# Patient Record
Sex: FEMALE | NOT HISPANIC OR LATINO | Employment: UNEMPLOYED | URBAN - METROPOLITAN AREA
[De-identification: names, ages, dates, MRNs, and addresses within clinical notes are randomized per-mention and may not be internally consistent; named-entity substitution may affect disease eponyms.]

---

## 2021-11-06 ENCOUNTER — HOSPITAL ENCOUNTER (EMERGENCY)
Facility: HOSPITAL | Age: 21
Discharge: HOME | End: 2021-11-06
Attending: EMERGENCY MEDICINE | Admitting: EMERGENCY MEDICINE

## 2021-11-06 VITALS
RESPIRATION RATE: 18 BRPM | OXYGEN SATURATION: 99 % | HEART RATE: 84 BPM | TEMPERATURE: 98.3 F | SYSTOLIC BLOOD PRESSURE: 104 MMHG | DIASTOLIC BLOOD PRESSURE: 80 MMHG

## 2021-11-06 DIAGNOSIS — R21 SKIN RASH: Primary | ICD-10-CM

## 2021-11-06 PROCEDURE — 99283 EMERGENCY DEPT VISIT LOW MDM: CPT

## 2021-11-06 NOTE — DISCHARGE INSTRUCTIONS
All the medicine you need for this rash is found over-the-counter.  You can use hydrocortisone cream or ointment to apply directly to the rash and you can to use Benadryl over-the-counter as instructed on the bottle

## 2021-11-06 NOTE — ED TRIAGE NOTES
Pt has small red rash on bilateral forearm that are itching started yesterday.     Pt arrives in triage with mask on. Triage staff wearing N95 masks and goggles.

## 2021-11-06 NOTE — ED PROVIDER NOTES
EMERGENCY DEPARTMENT ENCOUNTER    Room Number:  19/19  Date of encounter:  11/6/2021  PCP: Provider, No Known  Historian: Patient      HPI:  Chief Complaint: Rash  A complete HPI/ROS/PMH/PSH/SH/FH are unobtainable due to: Nothing    Context: Ariela Degroot is a 20 y.o. female who presents to the ED c/o itchy rash on both arms that started last night.  Patient said there is some bumps, it is a little red, is itchy, and it kept her up all night.  She is not aware of any allergen she was exposed to, and she has no other signs and symptoms of a systemic allergic reaction.  Currently she describes it as moderate and she has not taken anything for      PAST MEDICAL HISTORY  Active Ambulatory Problems     Diagnosis Date Noted   • No Active Ambulatory Problems     Resolved Ambulatory Problems     Diagnosis Date Noted   • No Resolved Ambulatory Problems     No Additional Past Medical History         PAST SURGICAL HISTORY  No past surgical history on file.      FAMILY HISTORY  No family history on file.      SOCIAL HISTORY  Social History     Socioeconomic History   • Marital status: Single         ALLERGIES  Patient has no known allergies.        REVIEW OF SYSTEMS  Review of Systems     All systems reviewed and negative except for those discussed in HPI.       PHYSICAL EXAM    I have reviewed the triage vital signs and nursing notes.    ED Triage Vitals [11/06/21 1504]   Temp Heart Rate Resp BP SpO2   98.3 °F (36.8 °C) 84 18 104/80 99 %      Temp src Heart Rate Source Patient Position BP Location FiO2 (%)   Oral -- -- -- --       Physical Exam  GENERAL: *not distressed  HENT: nares patent  EYES: no scleral icterus  CV: regular rhythm, regular rate  RESPIRATORY: normal effort  ABDOMEN: soft  MUSCULOSKELETAL: no deformity  NEURO: alert, moves all extremities, follows commands  SKIN: warm, dry.  There are few small patches of reddish bumps on both forearms.  It is apparent that she has been scratching at them, but they  do not look infected or inflamed.  These are more maculopapular than urticarial and they appear to be in some patches.  She does not think she got into any poison ivy or similar.        LAB RESULTS  No results found for this or any previous visit (from the past 24 hour(s)).    Ordered the above labs and independently reviewed the results.        RADIOLOGY  No Radiology Exams Resulted Within Past 24 Hours    I ordered the above noted radiological studies. Reviewed by me and discussed with radiologist.  See dictation for official radiology interpretation.      PROCEDURES    Procedures      MEDICATIONS GIVEN IN ER    Medications - No data to display      PROGRESS, DATA ANALYSIS, CONSULTS, AND MEDICAL DECISION MAKING    All labs have been independently reviewed by me.  All radiology studies have been reviewed by me and discussed with radiologist dictating the report.   EKG's independently viewed and interpreted by me.  Discussion below represents my analysis of pertinent findings related to patient's condition, differential diagnosis, treatment plan and final disposition.        ED Course as of 11/06/21 1923   Sat Nov 06, 2021 1923 This is a very benign looking rash and I think she can treat it topically at home [DP]      ED Course User Index  [DP] Britton Dias MD           PPE: The patient wore a surgical mask throughout the entire patient encounter. I wore an N95.    AS OF 19:23 EDT VITALS:    BP - 104/80  HR - 84  TEMP - 98.3 °F (36.8 °C) (Oral)  O2 SATS - 99%        DIAGNOSIS  Final diagnoses:   Skin rash         DISPOSITION  Discharge           Britton Dias MD  11/06/21 1923

## 2022-01-24 ENCOUNTER — HOSPITAL ENCOUNTER (EMERGENCY)
Facility: HOSPITAL | Age: 22
Discharge: HOME | End: 2022-01-24
Attending: EMERGENCY MEDICINE | Admitting: EMERGENCY MEDICINE

## 2022-01-24 ENCOUNTER — APPOINTMENT (OUTPATIENT)
Dept: CT IMAGING | Facility: HOSPITAL | Age: 22
End: 2022-01-24

## 2022-01-24 VITALS
WEIGHT: 105.82 LBS | BODY MASS INDEX: 19.47 KG/M2 | RESPIRATION RATE: 18 BRPM | TEMPERATURE: 98.4 F | HEART RATE: 95 BPM | DIASTOLIC BLOOD PRESSURE: 61 MMHG | OXYGEN SATURATION: 100 % | SYSTOLIC BLOOD PRESSURE: 99 MMHG | HEIGHT: 62 IN

## 2022-01-24 DIAGNOSIS — R53.1 GENERALIZED WEAKNESS: Primary | ICD-10-CM

## 2022-01-24 DIAGNOSIS — R10.30 LOWER ABDOMINAL PAIN: ICD-10-CM

## 2022-01-24 LAB
ALBUMIN SERPL-MCNC: 4 G/DL (ref 3.5–5.2)
ALBUMIN/GLOB SERPL: 1.5 G/DL
ALP SERPL-CCNC: 70 U/L (ref 39–117)
ALT SERPL W P-5'-P-CCNC: 8 U/L (ref 1–33)
ANION GAP SERPL CALCULATED.3IONS-SCNC: 10.7 MMOL/L (ref 5–15)
AST SERPL-CCNC: 10 U/L (ref 1–32)
BASOPHILS # BLD AUTO: 0.08 10*3/MM3 (ref 0–0.2)
BASOPHILS NFR BLD AUTO: 0.9 % (ref 0–1.5)
BILIRUB SERPL-MCNC: 0.8 MG/DL (ref 0–1.2)
BILIRUB UR QL STRIP: NEGATIVE
BUN SERPL-MCNC: 15 MG/DL (ref 6–20)
BUN/CREAT SERPL: 50 (ref 7–25)
CALCIUM SPEC-SCNC: 8.8 MG/DL (ref 8.6–10.5)
CHLORIDE SERPL-SCNC: 100 MMOL/L (ref 98–107)
CLARITY UR: CLEAR
CO2 SERPL-SCNC: 22.3 MMOL/L (ref 22–29)
COLOR UR: YELLOW
CREAT SERPL-MCNC: 0.3 MG/DL (ref 0.57–1)
DEPRECATED RDW RBC AUTO: 40.6 FL (ref 37–54)
EOSINOPHIL # BLD AUTO: 0.1 10*3/MM3 (ref 0–0.4)
EOSINOPHIL NFR BLD AUTO: 1.1 % (ref 0.3–6.2)
ERYTHROCYTE [DISTWIDTH] IN BLOOD BY AUTOMATED COUNT: 13 % (ref 12.3–15.4)
GFR SERPL CREATININE-BSD FRML MDRD: >150 ML/MIN/1.73
GFR SERPL CREATININE-BSD FRML MDRD: >150 ML/MIN/1.73
GLOBULIN UR ELPH-MCNC: 2.6 GM/DL
GLUCOSE SERPL-MCNC: 136 MG/DL (ref 65–99)
GLUCOSE UR STRIP-MCNC: ABNORMAL MG/DL
HCG SERPL QL: NEGATIVE
HCT VFR BLD AUTO: 38.5 % (ref 34–46.6)
HGB BLD-MCNC: 12.3 G/DL (ref 12–15.9)
HGB UR QL STRIP.AUTO: NEGATIVE
HOLD SPECIMEN: NORMAL
IMM GRANULOCYTES # BLD AUTO: 0.02 10*3/MM3 (ref 0–0.05)
IMM GRANULOCYTES NFR BLD AUTO: 0.2 % (ref 0–0.5)
KETONES UR QL STRIP: ABNORMAL
LEUKOCYTE ESTERASE UR QL STRIP.AUTO: NEGATIVE
LIPASE SERPL-CCNC: 8 U/L (ref 13–60)
LYMPHOCYTES # BLD AUTO: 2.53 10*3/MM3 (ref 0.7–3.1)
LYMPHOCYTES NFR BLD AUTO: 28.1 % (ref 19.6–45.3)
MCH RBC QN AUTO: 27.5 PG (ref 26.6–33)
MCHC RBC AUTO-ENTMCNC: 31.9 G/DL (ref 31.5–35.7)
MCV RBC AUTO: 85.9 FL (ref 79–97)
MONOCYTES # BLD AUTO: 0.67 10*3/MM3 (ref 0.1–0.9)
MONOCYTES NFR BLD AUTO: 7.4 % (ref 5–12)
NEUTROPHILS NFR BLD AUTO: 5.6 10*3/MM3 (ref 1.7–7)
NEUTROPHILS NFR BLD AUTO: 62.3 % (ref 42.7–76)
NITRITE UR QL STRIP: NEGATIVE
NRBC BLD AUTO-RTO: 0 /100 WBC (ref 0–0.2)
PH UR STRIP.AUTO: <=5 [PH] (ref 5–8)
PLATELET # BLD AUTO: 281 10*3/MM3 (ref 140–450)
PMV BLD AUTO: 11.2 FL (ref 6–12)
POTASSIUM SERPL-SCNC: 3.8 MMOL/L (ref 3.5–5.2)
PROT SERPL-MCNC: 6.6 G/DL (ref 6–8.5)
PROT UR QL STRIP: NEGATIVE
RBC # BLD AUTO: 4.48 10*6/MM3 (ref 3.77–5.28)
SODIUM SERPL-SCNC: 133 MMOL/L (ref 136–145)
SP GR UR STRIP: >=1.03 (ref 1–1.03)
UROBILINOGEN UR QL STRIP: ABNORMAL
WBC NRBC COR # BLD: 9 10*3/MM3 (ref 3.4–10.8)
WHOLE BLOOD HOLD SPECIMEN: NORMAL
WHOLE BLOOD HOLD SPECIMEN: NORMAL

## 2022-01-24 PROCEDURE — 84703 CHORIONIC GONADOTROPIN ASSAY: CPT | Performed by: EMERGENCY MEDICINE

## 2022-01-24 PROCEDURE — 85025 COMPLETE CBC W/AUTO DIFF WBC: CPT | Performed by: EMERGENCY MEDICINE

## 2022-01-24 PROCEDURE — 74177 CT ABD & PELVIS W/CONTRAST: CPT

## 2022-01-24 PROCEDURE — 99284 EMERGENCY DEPT VISIT MOD MDM: CPT

## 2022-01-24 PROCEDURE — 83690 ASSAY OF LIPASE: CPT | Performed by: EMERGENCY MEDICINE

## 2022-01-24 PROCEDURE — 25010000002 IOPAMIDOL 61 % SOLUTION: Performed by: EMERGENCY MEDICINE

## 2022-01-24 PROCEDURE — 36415 COLL VENOUS BLD VENIPUNCTURE: CPT

## 2022-01-24 PROCEDURE — 81003 URINALYSIS AUTO W/O SCOPE: CPT | Performed by: EMERGENCY MEDICINE

## 2022-01-24 PROCEDURE — 80053 COMPREHEN METABOLIC PANEL: CPT | Performed by: EMERGENCY MEDICINE

## 2022-01-24 RX ORDER — CEPHALEXIN 500 MG/1
500 CAPSULE ORAL 3 TIMES DAILY
Qty: 9 CAPSULE | Refills: 0 | Status: SHIPPED | OUTPATIENT
Start: 2022-01-24

## 2022-01-24 RX ORDER — SODIUM CHLORIDE 0.9 % (FLUSH) 0.9 %
10 SYRINGE (ML) INJECTION AS NEEDED
Status: DISCONTINUED | OUTPATIENT
Start: 2022-01-24 | End: 2022-01-24 | Stop reason: HOSPADM

## 2022-01-24 RX ADMIN — IOPAMIDOL 100 ML: 612 INJECTION, SOLUTION INTRAVENOUS at 07:53

## 2022-01-24 RX ADMIN — SODIUM CHLORIDE 1000 ML: 9 INJECTION, SOLUTION INTRAVENOUS at 07:25

## 2022-01-24 NOTE — ED NOTES
Pt c/o of lower abd pain that started around 0100. Pt states that she is feeling very weak and fatigued. Pt states that she is so weak she cannot walk. Pt was hypotensive for EMS and has had 300mL bolus brining her to 106/61. Pt states that her last period was 2 weeks ago. Pt states that her pain is 7/10 in lower abd.      Manuel Rome RN  01/24/22 0618

## 2022-01-24 NOTE — ED TRIAGE NOTES
Pt arrives to ED via EMS with c/o of abdominal pain and hypotension.  Unable to eat/drink today.  Pt is diabetic ().  States she feels drowsy.    Patient was placed in face mask during first look triage.  Patient was wearing a face mask throughout encounter.  I wore personal protective equipment throughout the encounter.  Hand hygiene was performed before and after patient encounter.

## 2022-01-24 NOTE — ED PROVIDER NOTES
EMERGENCY DEPARTMENT ENCOUNTER    Room Number:  11/11  PCP: Provider, No Known  Historian: Patient  History Limited By: Nothing      HPI  Chief Complaint: Abdominal pain weak and fatigued  Context: Zane Titus is a 21 y.o. female who presents to the ED c/o weakness fatigue abdominal pain.  Patient states she is diabetic.  States she has felt fatigue and weakness for the last 3 days.  Patient has had lower abdominal pain.  Has had no vomiting or diarrhea.  Patient states she has had some mild nausea.  Has had no vaginal bleeding or vaginal discharge.  No prior abdominal surgeries.  Patient is had no cough congestion or fevers.  Patient states she has had some weight loss as well.  States she has no appetite.        Location: Lower abdomen  Radiation: None  Character: Cramping  Duration: 3 days  Severity: Moderate  Progression: Not improving  Aggravating Factors: Nothing  Alleviating Factors: Nothing        MEDICAL RECORD REVIEW    Patient was here November 2021 with skin rash          PAST MEDICAL HISTORY  Active Ambulatory Problems     Diagnosis Date Noted   • No Active Ambulatory Problems     Resolved Ambulatory Problems     Diagnosis Date Noted   • No Resolved Ambulatory Problems     No Additional Past Medical History         PAST SURGICAL HISTORY  No past surgical history on file.      FAMILY HISTORY  No family history on file.      SOCIAL HISTORY  Social History     Socioeconomic History   • Marital status: Single         ALLERGIES  Patient has no known allergies.        REVIEW OF SYSTEMS  Review of Systems   Constitutional: Positive for fatigue and unexpected weight change. Negative for fever.   HENT: Negative for sore throat.    Eyes: Negative.    Respiratory: Negative for cough and shortness of breath.    Cardiovascular: Negative for chest pain.   Gastrointestinal: Positive for abdominal pain. Negative for diarrhea and vomiting.   Genitourinary: Negative for dysuria.   Musculoskeletal: Negative for  neck pain.   Skin: Negative for rash.   Allergic/Immunologic: Negative.    Neurological: Negative for weakness, numbness and headaches.   Hematological: Negative.    Psychiatric/Behavioral: Negative.    All other systems reviewed and are negative.           PHYSICAL EXAM  ED Triage Vitals [01/24/22 0608]   Temp Heart Rate Resp BP SpO2   98.4 °F (36.9 °C) 90 18 106/61 100 %      Temp src Heart Rate Source Patient Position BP Location FiO2 (%)   Oral -- -- -- --       Physical Exam  Vitals and nursing note reviewed.   Constitutional:       General: She is not in acute distress.  HENT:      Head: Normocephalic and atraumatic.   Eyes:      Pupils: Pupils are equal, round, and reactive to light.   Cardiovascular:      Rate and Rhythm: Normal rate and regular rhythm.      Heart sounds: Normal heart sounds.   Pulmonary:      Effort: Pulmonary effort is normal. No respiratory distress.      Breath sounds: Normal breath sounds.   Abdominal:      Palpations: Abdomen is soft.      Tenderness: There is abdominal tenderness in the suprapubic area. There is no guarding or rebound.   Musculoskeletal:         General: Normal range of motion.      Cervical back: Normal range of motion and neck supple.   Skin:     General: Skin is warm and dry.      Findings: No rash.   Neurological:      Mental Status: She is alert and oriented to person, place, and time.      Sensory: Sensation is intact.   Psychiatric:         Mood and Affect: Mood and affect normal.       Patient was wearing a face mask when I entered the room and they continued to wear a mask throughout their stay in the ED.  I wore PPE, including  gloves, face mask with shield or face mask with goggles whenever I was in the room with patient.       LAB RESULTS  Recent Results (from the past 24 hour(s))   Comprehensive Metabolic Panel    Collection Time: 01/24/22  6:27 AM    Specimen: Blood   Result Value Ref Range    Glucose 136 (H) 65 - 99 mg/dL    BUN 15 6 - 20 mg/dL     Creatinine 0.30 (L) 0.57 - 1.00 mg/dL    Sodium 133 (L) 136 - 145 mmol/L    Potassium 3.8 3.5 - 5.2 mmol/L    Chloride 100 98 - 107 mmol/L    CO2 22.3 22.0 - 29.0 mmol/L    Calcium 8.8 8.6 - 10.5 mg/dL    Total Protein 6.6 6.0 - 8.5 g/dL    Albumin 4.00 3.50 - 5.20 g/dL    ALT (SGPT) 8 1 - 33 U/L    AST (SGOT) 10 1 - 32 U/L    Alkaline Phosphatase 70 39 - 117 U/L    Total Bilirubin 0.8 0.0 - 1.2 mg/dL    eGFR Non African Amer >150 >60 mL/min/1.73    eGFR  African Amer >150 >60 mL/min/1.73    Globulin 2.6 gm/dL    A/G Ratio 1.5 g/dL    BUN/Creatinine Ratio 50.0 (H) 7.0 - 25.0    Anion Gap 10.7 5.0 - 15.0 mmol/L   Lipase    Collection Time: 01/24/22  6:27 AM    Specimen: Blood   Result Value Ref Range    Lipase 8 (L) 13 - 60 U/L   hCG, Serum, Qualitative    Collection Time: 01/24/22  6:27 AM    Specimen: Blood   Result Value Ref Range    HCG Qualitative Negative Negative   Green Top (Gel)    Collection Time: 01/24/22  6:27 AM   Result Value Ref Range    Extra Tube Hold for add-ons.    Lavender Top    Collection Time: 01/24/22  6:27 AM   Result Value Ref Range    Extra Tube hold for add-on    Light Blue Top    Collection Time: 01/24/22  6:27 AM   Result Value Ref Range    Extra Tube hold for add-on    CBC Auto Differential    Collection Time: 01/24/22  6:27 AM    Specimen: Blood   Result Value Ref Range    WBC 9.00 3.40 - 10.80 10*3/mm3    RBC 4.48 3.77 - 5.28 10*6/mm3    Hemoglobin 12.3 12.0 - 15.9 g/dL    Hematocrit 38.5 34.0 - 46.6 %    MCV 85.9 79.0 - 97.0 fL    MCH 27.5 26.6 - 33.0 pg    MCHC 31.9 31.5 - 35.7 g/dL    RDW 13.0 12.3 - 15.4 %    RDW-SD 40.6 37.0 - 54.0 fl    MPV 11.2 6.0 - 12.0 fL    Platelets 281 140 - 450 10*3/mm3    Neutrophil % 62.3 42.7 - 76.0 %    Lymphocyte % 28.1 19.6 - 45.3 %    Monocyte % 7.4 5.0 - 12.0 %    Eosinophil % 1.1 0.3 - 6.2 %    Basophil % 0.9 0.0 - 1.5 %    Immature Grans % 0.2 0.0 - 0.5 %    Neutrophils, Absolute 5.60 1.70 - 7.00 10*3/mm3    Lymphocytes, Absolute 2.53 0.70 -  3.10 10*3/mm3    Monocytes, Absolute 0.67 0.10 - 0.90 10*3/mm3    Eosinophils, Absolute 0.10 0.00 - 0.40 10*3/mm3    Basophils, Absolute 0.08 0.00 - 0.20 10*3/mm3    Immature Grans, Absolute 0.02 0.00 - 0.05 10*3/mm3    nRBC 0.0 0.0 - 0.2 /100 WBC   Urinalysis With Microscopic If Indicated (No Culture) - Urine, Clean Catch    Collection Time: 01/24/22  8:17 AM    Specimen: Urine, Clean Catch   Result Value Ref Range    Color, UA Yellow Yellow, Straw    Appearance, UA Clear Clear    pH, UA <=5.0 5.0 - 8.0    Specific Gravity, UA >=1.030 1.005 - 1.030    Glucose,  mg/dL (1+) (A) Negative    Ketones, UA 80 mg/dL (3+) (A) Negative    Bilirubin, UA Negative Negative    Blood, UA Negative Negative    Protein, UA Negative Negative    Leuk Esterase, UA Negative Negative    Nitrite, UA Negative Negative    Urobilinogen, UA 0.2 E.U./dL 0.2 - 1.0 E.U./dL       Ordered the above labs and reviewed the results.        RADIOLOGY  CT Abdomen Pelvis With Contrast   Preliminary Result   1. Significantly thickened urinary bladder wall. Please correlate   clinically for acute cystitis/UTI. There is no evidence for   pyelonephritis.   2. Small amount of free fluid at the cul-de-sac is likely secondary to   rupture of an ovarian cyst or follicle. The central low attenuation   within the uterus is not typical in appearance for endometrial   thickening. Gynecology follow-up is recommended.       Discussed with Dr. Resendiz.               Ordered the above noted radiological studies. Reviewed by me in PACS.  Discussed with Dr. Munoz (radiologist) regarding CT/MRI scan results.          PROCEDURES  Procedures            MEDICATIONS GIVEN IN ER  Medications   sodium chloride 0.9 % flush 10 mL (has no administration in time range)   sodium chloride 0.9 % bolus 1,000 mL (0 mL Intravenous Stopped 1/24/22 7044)   iopamidol (ISOVUE-300) 61 % injection 100 mL (100 mL Intravenous Given 1/24/22 8855)             PROGRESS AND CONSULTS  ED  Course as of 01/24/22 1117   Mon Jan 24, 2022   0905 09:05 EST  Patient presents with multiple complaints.  Patient does appear dehydrated.  Patient is diabetic but CO2 is 22 and anion gap is 10 so doubt ketoacidosis.  Has been given fluids here.  Patient CT scan shows a thick-walled bladder however her urine does not appear infected.  We will give her 3 days of antibiotics for this.  Patient also has abnormal appearing uterus and will be referred to OB/GYN.  Instructed to return here for worsening symptoms.  Patient refused Covid swab so this is still a possibility.  No evidence of appendicitis ectopic pregnancy. [SL]      ED Course User Index  [SL] Brando Resendiz MD           MEDICAL DECISION MAKING      MDM  Number of Diagnoses or Management Options     Amount and/or Complexity of Data Reviewed  Clinical lab tests: reviewed and ordered (White blood cell count negative)  Tests in the radiology section of CPT®: reviewed and ordered (CT scan with thick-walled bladder)               DIAGNOSIS  Final diagnoses:   Generalized weakness   Lower abdominal pain           DISPOSITION  DISCHARGE    Patient discharged in stable condition.    Reviewed implications of results, diagnosis, meds, responsibility to follow up, warning signs and symptoms of possible worsening, potential complications and reasons to return to ER, including worsening symptoms.    Patient/Family voiced understanding of above instructions.    Discussed plan for discharge, as there is no emergent indication for admission. Patient referred to primary care provider for BP management due to today's BP. Pt/family is agreeable and understands need for follow up and repeat testing.  Pt is aware that discharge does not mean that nothing is wrong but it indicates no emergency is present that requires admission and they must continue care with follow-up as given below or physician of their choice.     FOLLOW-UP  PATIENT CONNECTION - [facility]  [place] [address]  [phone]  Schedule an appointment as soon as possible for a visit       Adama Blake MD  [address] [facility] [address] [phone]    Schedule an appointment as soon as possible for a visit            Medication List      New Prescriptions    cephalexin 500 MG capsule  Commonly known as: KEFLEX  Take 1 capsule by mouth 3 (Three) Times a Day.           Where to Get Your Medications      These medications were sent to [id number] - [place], [place] - [address] [address] AT [facility] OF [place] [address] - [phone]  - [phone] FX  [address], [place] [address]    Phone: [phone]   · cephalexin 500 MG capsule             Latest Documented Vital Signs:  As of 11:17 EST  BP- 99/61 HR- 95 Temp- 98.4 °F (36.9 °C) (Oral) O2 sat- 100%                         Brando Resendiz MD  01/24/22 0251

## 2022-01-27 NOTE — PROGRESS NOTES
"Chief Complaint  Establish Care and Weight Loss (20-25 lbs)    Subjective          Ariela Germain presents to [facility]  History of Present Illness     Establish care, new to me and [facility] primary care    Patient today has multiple complaints including hair loss, unintentional weight loss of 20 to 25 pounds, bleeding gums, yellowish appearance.    Sees endocrine doctor at [place] per patient.  Takes Tresiba 25 units in the morning, humalag three times daily on sliding scale.  She did some blood work with that doctor about 4 months.  She will see them again in April. Usually around 148 but can fluctuate.  She does not eat much and very tired.  Unknownif thyroid has been an issue  Sees Gynecology for painful periods and prescribed Aurovela    Trouble swallowing ging on for about 4 months.  Occasionally food is coming back up.  Never evaluated.  She thinks that is the cause of abnormal weight loss.    Objective   Vital Signs:   /60 (BP Location: Right arm, Patient Position: Sitting, Cuff Size: Adult)   Pulse 104   Temp 98.2 °F (36.8 °C) (Temporal)   Resp 16   Ht 157.5 cm (62\")   Wt 54.3 kg (119 lb 9.6 oz)   SpO2 98%   BMI 21.88 kg/m²     Physical Exam  Vitals and nursing note reviewed.   Constitutional:       General: She is not in acute distress.     Appearance: Normal appearance.   Neck:      Thyroid: No thyromegaly or thyroid tenderness.      Trachea: Trachea normal.   Cardiovascular:      Rate and Rhythm: Normal rate and regular rhythm.      Heart sounds: Normal heart sounds. No murmur heard.      Pulmonary:      Effort: Pulmonary effort is normal.      Breath sounds: Normal breath sounds.   Abdominal:      General: Bowel sounds are normal.      Palpations: Abdomen is soft.      Tenderness: There is no abdominal tenderness.   Musculoskeletal:      Cervical back: Normal range of motion.   Lymphadenopathy:      Cervical: No cervical adenopathy.   Neurological:      Mental " Status: She is alert.        Result Review :   The following data was reviewed by: Kenneth Villa MD on 01/28/2022:                Assessment and Plan    Diagnoses and all orders for this visit:    1. Establishing care with new doctor, encounter for (Primary)  -     CBC w AUTO Differential; Future  -     Comprehensive metabolic panel; Future  -     Hemoglobin A1c; Future  -     Lipid Panel With LDL / HDL Ratio; Future  -     Microalbumin / Creatinine Urine Ratio - Urine, Clean Catch; Future  -     Urinalysis With Microscopic - Urine, Clean Catch; Future  -     TSH Rfx On Abnormal To Free T4; Future  -     Ambulatory Referral to Speech Therapy    2. Type 1 diabetes mellitus without complication (HCC)  -     CBC w AUTO Differential; Future  -     Comprehensive metabolic panel; Future  -     Hemoglobin A1c; Future  -     Microalbumin / Creatinine Urine Ratio - Urine, Clean Catch; Future    3. Abnormal weight loss  -     CBC w AUTO Differential; Future  -     Comprehensive metabolic panel; Future  -     Lipid Panel With LDL / HDL Ratio; Future  -     TSH Rfx On Abnormal To Free T4; Future    4. Fatigue, unspecified type  -     CBC w AUTO Differential; Future  -     Comprehensive metabolic panel; Future  -     Hemoglobin A1c; Future  -     TSH Rfx On Abnormal To Free T4; Future    5. Screening for blood or protein in urine  -     Urinalysis With Microscopic - Urine, Clean Catch; Future    6. Screening for lipid disorders  -     Lipid Panel With LDL / HDL Ratio; Future    7. Painful menstrual periods    8. Oropharyngeal dysphagia  -     Ambulatory Referral to Speech Therapy    9. Hair loss  -     Ambulatory Referral to Dermatology      I will get some labs today to check stability of the type 1 diabetes.  Also get some labs to check on the abnormal weight loss, fatigue, hair loss.  Referral to speech to look into the dysphagia which the patient believes the abnormal weight loss is secondary to the dysphagia.  Pain from  menstrual periods are being followed by gynecology currently and no excessive bleeding currently.  I will have the patient follow back up with me in 1 month.  Continue medication for diabetes as above.        Follow Up   Return in about 4 weeks (around 2/25/2022) for Recheck - DM, Fatigue, weight loss.  Patient was given instructions and counseling regarding her condition or for health maintenance advice. Please see specific information pulled into the AVS if appropriate.

## 2022-01-28 ENCOUNTER — OFFICE VISIT (OUTPATIENT)
Dept: INTERNAL MEDICINE | Facility: CLINIC | Age: 22
End: 2022-01-28

## 2022-01-28 VITALS
HEART RATE: 104 BPM | DIASTOLIC BLOOD PRESSURE: 60 MMHG | WEIGHT: 119.6 LBS | SYSTOLIC BLOOD PRESSURE: 104 MMHG | BODY MASS INDEX: 22.01 KG/M2 | TEMPERATURE: 98.2 F | OXYGEN SATURATION: 98 % | RESPIRATION RATE: 16 BRPM | HEIGHT: 62 IN

## 2022-01-28 DIAGNOSIS — E10.9 TYPE 1 DIABETES MELLITUS WITHOUT COMPLICATION: ICD-10-CM

## 2022-01-28 DIAGNOSIS — Z76.89 ESTABLISHING CARE WITH NEW DOCTOR, ENCOUNTER FOR: Primary | ICD-10-CM

## 2022-01-28 DIAGNOSIS — Z13.220 SCREENING FOR LIPID DISORDERS: ICD-10-CM

## 2022-01-28 DIAGNOSIS — L65.9 HAIR LOSS: ICD-10-CM

## 2022-01-28 DIAGNOSIS — R63.4 ABNORMAL WEIGHT LOSS: ICD-10-CM

## 2022-01-28 DIAGNOSIS — R13.12 OROPHARYNGEAL DYSPHAGIA: ICD-10-CM

## 2022-01-28 DIAGNOSIS — Z13.89 SCREENING FOR BLOOD OR PROTEIN IN URINE: ICD-10-CM

## 2022-01-28 DIAGNOSIS — R53.83 FATIGUE, UNSPECIFIED TYPE: ICD-10-CM

## 2022-01-28 DIAGNOSIS — N94.6 PAINFUL MENSTRUAL PERIODS: ICD-10-CM

## 2022-01-28 PROCEDURE — 99204 OFFICE O/P NEW MOD 45 MIN: CPT | Performed by: FAMILY MEDICINE

## 2022-01-28 RX ORDER — BLOOD-GLUCOSE METER
KIT MISCELLANEOUS
COMMUNITY
Start: 2022-01-18

## 2022-01-28 RX ORDER — INSULIN DEGLUDEC INJECTION 100 U/ML
25 INJECTION, SOLUTION SUBCUTANEOUS DAILY
COMMUNITY
Start: 2021-12-01 | End: 2022-02-25 | Stop reason: SDUPTHER

## 2022-01-28 RX ORDER — LANCETS 28 GAUGE
1 EACH MISCELLANEOUS 4 TIMES DAILY
COMMUNITY
Start: 2022-01-13

## 2022-01-28 RX ORDER — PEN NEEDLE, DIABETIC 32GX 5/32"
1 NEEDLE, DISPOSABLE MISCELLANEOUS 4 TIMES DAILY
COMMUNITY
Start: 2021-11-01 | End: 2022-02-25 | Stop reason: SDUPTHER

## 2022-01-28 RX ORDER — NORETHINDRONE ACETATE AND ETHINYL ESTRADIOL 1; .02 MG/1; MG/1
1 TABLET ORAL DAILY
COMMUNITY

## 2022-01-28 RX ORDER — INSULIN LISPRO 100 [IU]/ML
INJECTION, SOLUTION INTRAVENOUS; SUBCUTANEOUS
COMMUNITY
Start: 2022-01-14 | End: 2022-02-25 | Stop reason: SDUPTHER

## 2022-01-28 NOTE — PATIENT INSTRUCTIONS
Alopecia Areata, Adult    Alopecia areata is a condition that causes hair loss. A person with this condition may lose hair on the scalp in patches. In some cases, a person may lose all the hair on the scalp or all the hair from the face and body. Having this condition can be emotionally difficult, but it is not dangerous.  Alopecia areata is an autoimmune disease. This means that your body's defense system (immune system) mistakes normal parts of the body for germs or other things that can make you sick. When you have alopecia areata, the immune system attacks the hair follicles.  What are the causes?  The cause of this condition is not known.  What increases the risk?  You are more likely to develop this condition if you have:  · A family history of alopecia.  · A family history of another autoimmune disease, including type 1 diabetes and thyroid autoimmune disease.  · Eczema, asthma, and allergies.  · Down syndrome.  What are the signs or symptoms?  The main symptom of this condition is round spots of patchy hair loss on the scalp. The spots may be mildly itchy. Other symptoms include:  · Short dark hairs in the bald patches that are wider at the top (exclamation point hairs).  · Dents, white spots, or lines in the fingernails or toenails.  · Balding and body hair loss. This is rare.  Alopecia areata usually develops in childhood, but it can develop at any age. For some people, their hair grows back on its own and hair loss does not happen again. For others, their hair may fall out and grow back in cycles. The hair loss may last many years.  How is this diagnosed?  This condition is diagnosed based on your symptoms and family history. Your health care provider will also check your scalp skin, teeth, and nails. Your health care provider may refer you to a specialist in hair and skin disorders (dermatologist).  You may also have tests, including:  · A hair pull test.  · Blood tests or other screening tests to check  for autoimmune diseases, such as thyroid disease or diabetes.  · Skin biopsy to confirm the diagnosis.  · A procedure to examine the skin with a lighted magnifying instrument (dermoscopy).  How is this treated?  There is no cure for alopecia areata. The goals of treatment are to promote the regrowth of hair and prevent the immune system from overreacting. No single treatment is right for all people with alopecia areata. It depends on the type of hair loss you have and how severe it is.  Work with your health care provider to find the best treatment for you. Treatment may include:  · Regular checkups to make sure the condition is not getting worse . This is called watchful waiting.  · Using steroid creams or pills for 6-8 weeks to stop the immune reaction and help hair to regrow more quickly.  · Using other medicines on your skin (topical medicines) to change the immune system response and support the hair growth cycle.  · Steroid injections.  · Therapy and counseling with a support group or therapist if you are having trouble coping with hair loss.  Follow these instructions at home:  Medicines  · Apply topical creams only as told by your health care provider.  · Take over-the-counter and prescription medicines only as told by your health care provider.  General instructions  · Learn as much as you can about your condition.  · Consider getting a wig or products to make hair look todd or to cover bald spots, if you feel uncomfortable with your appearance.  · Get therapy or counseling if you are having a hard time coping with hair loss. Ask your health care provider to recommend a counselor or support group.  · Keep all follow-up visits as told by your health care provider. This is important.  Where to find more information  · National Alopecia Areata Foundation: naaf.org  Contact a health care provider if:  · Your hair loss gets worse, even with treatment.  · You have new symptoms.  · You are struggling  emotionally.  Get help right away if:  · You have a sudden worsening of the hair loss.  Summary  · Alopecia areata is an autoimmune condition that makes your body's defense system (immune system) attack the hair follicles. This causes you to lose hair.  · Having this condition can be emotionally difficult, but it is not dangerous.  · Treatments may include regular checkups to make sure that the condition is not getting worse, medicines, and steroid injections.  This information is not intended to replace advice given to you by your health care provider. Make sure you discuss any questions you have with your health care provider.  Document Revised: 03/02/2021 Document Reviewed: 03/02/2021  Elsevier Patient Education © 2021 [employer] Inc.

## 2022-02-02 ENCOUNTER — TELEPHONE (OUTPATIENT)
Dept: INTERNAL MEDICINE | Facility: CLINIC | Age: 22
End: 2022-02-02

## 2022-02-02 NOTE — TELEPHONE ENCOUNTER
Hub staff attempted to follow warm transfer process and was unsuccessful     Caller: Ariela Germain    Relationship to patient: Self    Best call back number: [phone]     Patient is needing: PATIENT CALLED AGAIN TO RESCHEDULE THE LAB APPOINTMENT FOR 02/02/22 AT 2:15. PATIENT STATED THAT SHE WOULD LIKE TO COME EARLIER IN THE DAY, SOME TIME AROUND 11, IF POSSIBLE.

## 2022-02-02 NOTE — TELEPHONE ENCOUNTER
Hub staff attempted to follow warm transfer process and was unsuccessful     Caller: Ariela Germain    Relationship to patient: Self    Best call back number:[phone]     Patient is needing: PATIENT WANTING TO RESCHEDULE LABS.

## 2022-02-02 NOTE — TELEPHONE ENCOUNTER
Called patient to R/s and call goes straight to VM and VM not set up.  Please get time frame and dates that would work for patient.

## 2022-02-02 NOTE — TELEPHONE ENCOUNTER
Attempted to call patient again-  patient okay to come in early- no time slots available to move appt

## 2022-02-11 ENCOUNTER — TELEPHONE (OUTPATIENT)
Dept: INTERNAL MEDICINE | Facility: CLINIC | Age: 22
End: 2022-02-11

## 2022-02-11 NOTE — TELEPHONE ENCOUNTER
The order was placed.  I think the referral is just pending insurance.  I explained that due to her insurance it will be difficult to find one quickly.

## 2022-02-16 NOTE — TELEPHONE ENCOUNTER
Caller: Ariela Germain    Relationship: Self    Best call back number: [phone]    What specialty or service is being requested: DERMATOLOGIST    What is the office phone number: [phone]    Any additional details: PATIENT WOULD LIKE A CALL TO DISCUSS THIS.

## 2022-02-25 ENCOUNTER — OFFICE VISIT (OUTPATIENT)
Dept: INTERNAL MEDICINE | Facility: CLINIC | Age: 22
End: 2022-02-25

## 2022-02-25 VITALS
HEART RATE: 90 BPM | WEIGHT: 121 LBS | SYSTOLIC BLOOD PRESSURE: 100 MMHG | TEMPERATURE: 98.2 F | HEIGHT: 62 IN | DIASTOLIC BLOOD PRESSURE: 62 MMHG | BODY MASS INDEX: 22.26 KG/M2 | OXYGEN SATURATION: 98 %

## 2022-02-25 DIAGNOSIS — E10.9 TYPE 1 DIABETES MELLITUS WITHOUT COMPLICATION: Primary | ICD-10-CM

## 2022-02-25 DIAGNOSIS — R63.4 ABNORMAL WEIGHT LOSS: ICD-10-CM

## 2022-02-25 PROCEDURE — 99213 OFFICE O/P EST LOW 20 MIN: CPT | Performed by: FAMILY MEDICINE

## 2022-02-25 RX ORDER — INSULIN DEGLUDEC INJECTION 100 U/ML
25 INJECTION, SOLUTION SUBCUTANEOUS DAILY
Qty: 5 PEN | Refills: 1 | Status: SHIPPED | OUTPATIENT
Start: 2022-02-25

## 2022-02-25 RX ORDER — MIRTAZAPINE 7.5 MG/1
7.5 TABLET, FILM COATED ORAL NIGHTLY
Qty: 30 TABLET | Refills: 5 | Status: SHIPPED | OUTPATIENT
Start: 2022-02-25

## 2022-02-25 RX ORDER — PEN NEEDLE, DIABETIC 32GX 5/32"
1 NEEDLE, DISPOSABLE MISCELLANEOUS 4 TIMES DAILY
Qty: 200 EACH | Refills: 1 | Status: SHIPPED | OUTPATIENT
Start: 2022-02-25 | End: 2022-11-21

## 2022-02-25 RX ORDER — INSULIN LISPRO 100 [IU]/ML
INJECTION, SOLUTION INTRAVENOUS; SUBCUTANEOUS
Qty: 5 PEN | Refills: 1 | Status: SHIPPED | OUTPATIENT
Start: 2022-02-25

## 2022-02-25 NOTE — PATIENT INSTRUCTIONS
Diabetes Mellitus and Nutrition, Adult  When you have diabetes, or diabetes mellitus, it is very important to have healthy eating habits because your blood sugar (glucose) levels are greatly affected by what you eat and drink. Eating healthy foods in the right amounts, at about the same times every day, can help you:  · Control your blood glucose.  · Lower your risk of heart disease.  · Improve your blood pressure.  · Reach or maintain a healthy weight.  What can affect my meal plan?  Every person with diabetes is different, and each person has different needs for a meal plan. Your health care provider may recommend that you work with a dietitian to make a meal plan that is best for you. Your meal plan may vary depending on factors such as:  · The calories you need.  · The medicines you take.  · Your weight.  · Your blood glucose, blood pressure, and cholesterol levels.  · Your activity level.  · Other health conditions you have, such as heart or kidney disease.  How do carbohydrates affect me?  Carbohydrates, also called carbs, affect your blood glucose level more than any other type of food. Eating carbs naturally raises the amount of glucose in your blood. Carb counting is a method for keeping track of how many carbs you eat. Counting carbs is important to keep your blood glucose at a healthy level, especially if you use insulin or take certain oral diabetes medicines.  It is important to know how many carbs you can safely have in each meal. This is different for every person. Your dietitian can help you calculate how many carbs you should have at each meal and for each snack.  How does alcohol affect me?  Alcohol can cause a sudden decrease in blood glucose (hypoglycemia), especially if you use insulin or take certain oral diabetes medicines. Hypoglycemia can be a life-threatening condition. Symptoms of hypoglycemia, such as sleepiness, dizziness, and confusion, are similar to symptoms of having too much  "alcohol.  · Do not drink alcohol if:  ? Your health care provider tells you not to drink.  ? You are pregnant, may be pregnant, or are planning to become pregnant.  · If you drink alcohol:  ? Do not drink on an empty stomach.  ? Limit how much you use to:  § 0-1 drink a day for women.  § 0-2 drinks a day for men.  ? Be aware of how much alcohol is in your drink. In the [place] one drink equals one 12 oz bottle of beer (355 mL), one 5 oz glass of wine (148 mL), or one 1½ oz glass of hard liquor (44 mL).  ? Keep yourself hydrated with water, diet soda, or unsweetened iced tea.  § Keep in mind that regular soda, juice, and other mixers may contain a lot of sugar and must be counted as carbs.  What are tips for following this plan?    Reading food labels  · Start by checking the serving size on the \"Nutrition Facts\" label of packaged foods and drinks. The amount of calories, carbs, fats, and other nutrients listed on the label is based on one serving of the item. Many items contain more than one serving per package.  · Check the total grams (g) of carbs in one serving. You can calculate the number of servings of carbs in one serving by dividing the total carbs by 15. For example, if a food has 30 g of total carbs per serving, it would be equal to 2 servings of carbs.  · Check the number of grams (g) of saturated fats and trans fats in one serving. Choose foods that have a low amount or none of these fats.  · Check the number of milligrams (mg) of salt (sodium) in one serving. Most people should limit total sodium intake to less than 2,300 mg per day.  · Always check the nutrition information of foods labeled as \"low-fat\" or \"nonfat.\" These foods may be higher in added sugar or refined carbs and should be avoided.  · Talk to your dietitian to identify your daily goals for nutrients listed on the label.  Shopping  · Avoid buying canned, pre-made, or processed foods. These foods tend to be high in fat, sodium, and added " sugar.  · Shop around the outside edge of the grocery store. This is where you will most often find fresh fruits and vegetables, bulk grains, fresh meats, and fresh dairy.  Cooking  · Use low-heat cooking methods, such as baking, instead of high-heat cooking methods like deep frying.  · Cook using healthy oils, such as olive, canola, or sunflower oil.  · Avoid cooking with butter, cream, or high-fat meats.  Meal planning  · Eat meals and snacks regularly, preferably at the same times every day. Avoid going long periods of time without eating.  · Eat foods that are high in fiber, such as fresh fruits, vegetables, beans, and whole grains. Talk with your dietitian about how many servings of carbs you can eat at each meal.  · Eat 4-6 oz (112-168 g) of lean protein each day, such as lean meat, chicken, fish, eggs, or tofu. One ounce (oz) of lean protein is equal to:  ? 1 oz (28 g) of meat, chicken, or fish.  ? 1 egg.  ? ¼ cup (62 g) of tofu.  · Eat some foods each day that contain healthy fats, such as avocado, nuts, seeds, and fish.  What foods should I eat?  Fruits  Berries. Apples. Oranges. Peaches. Apricots. Plums. Grapes. Paulino. Papaya. Pomegranate. Kiwi. Cherries.  Vegetables  Lettuce. Spinach. Leafy greens, including kale, chard, jennifer greens, and mustard greens. Beets. Cauliflower. Cabbage. Broccoli. Carrots. Green beans. Tomatoes. Peppers. Onions. Cucumbers. Rattan sprouts.  Grains  Whole grains, such as whole-wheat or whole-grain bread, crackers, tortillas, cereal, and pasta. Unsweetened oatmeal. Quinoa. Brown or wild rice.  Meats and other proteins  Seafood. Poultry without skin. Lean cuts of poultry and beef. Tofu. Nuts. Seeds.  Dairy  Low-fat or fat-free dairy products such as milk, yogurt, and cheese.  The items listed above may not be a complete list of foods and beverages you can eat. Contact a dietitian for more information.  What foods should I avoid?  Fruits  Fruits canned with  syrup.  Vegetables  Canned vegetables. Frozen vegetables with butter or cream sauce.  Grains  Refined white flour and flour products such as bread, pasta, snack foods, and cereals. Avoid all processed foods.  Meats and other proteins  Fatty cuts of meat. Poultry with skin. Breaded or fried meats. Processed meat. Avoid saturated fats.  Dairy  Full-fat yogurt, cheese, or milk.  Beverages  Sweetened drinks, such as soda or iced tea.  The items listed above may not be a complete list of foods and beverages you should avoid. Contact a dietitian for more information.  Questions to ask a health care provider  · Do I need to meet with a diabetes educator?  · Do I need to meet with a dietitian?  · What number can I call if I have questions?  · When are the best times to check my blood glucose?  Where to find more information:  · American Diabetes Association: diabetes.org  · Academy of Nutrition and Dietetics: www.eatright.org  · National [place] of Diabetes and Digestive and Kidney Diseases: www.niddk.nih.gov  · Association of Diabetes Care and Education Specialists: www.diabeteseducator.org  Summary  · It is important to have healthy eating habits because your blood sugar (glucose) levels are greatly affected by what you eat and drink.  · A healthy meal plan will help you control your blood glucose and maintain a healthy lifestyle.  · Your health care provider may recommend that you work with a dietitian to make a meal plan that is best for you.  · Keep in mind that carbohydrates (carbs) and alcohol have immediate effects on your blood glucose levels. It is important to count carbs and to use alcohol carefully.  This information is not intended to replace advice given to you by your health care provider. Make sure you discuss any questions you have with your health care provider.  Document Revised: 11/24/2020 Document Reviewed: 11/24/2020  Elsevier Patient Education © 2021 Elsevier Inc.

## 2022-02-25 NOTE — PROGRESS NOTES
"Chief Complaint  Diabetes, Fatigue, and Weight Loss    Subjective          Ariela Germain presents to [facility]  History of Present Illness     She had established with me about 1 month ago complaining of hair loss, significant unintentional weight loss, and a few other issues.  As reminder she has a history of type 1 diabetes and is managed by [place] [facility] on insulin therapy.  She is following up today regarding the significant weight loss.    Her A1c last time was 7.5 with a fasting sugar of 286 and glucosuria.  Otherwise her labs were fairly unremarkable on February 1, 2022.    Interval history: weight loss has subsided.  A1c was 7.5.  Stable.  Seeing endocrine in April 2022.  Needs a refill of her insulin to get her to her April appointment.  Would like some medication to help with her appetite as she needs some help with stimulation of appetite.    Objective   Vital Signs:   /62 (BP Location: Left arm, Patient Position: Sitting, Cuff Size: Adult)   Pulse 90   Temp 98.2 °F (36.8 °C) (Temporal)   Ht 157.5 cm (62\")   Wt 54.9 kg (121 lb)   SpO2 98%   BMI 22.13 kg/m²     Physical Exam  Vitals and nursing note reviewed.   Constitutional:       General: She is not in acute distress.     Appearance: Normal appearance.   Cardiovascular:      Rate and Rhythm: Normal rate and regular rhythm.      Heart sounds: Normal heart sounds. No murmur heard.      Pulmonary:      Effort: Pulmonary effort is normal.      Breath sounds: Normal breath sounds.   Neurological:      Mental Status: She is alert.        Result Review :   The following data was reviewed by: Kenneth Villa MD on 02/25/2022:  Common labs    Common Labsle 2/1/22 2/1/22 2/1/22 2/1/22 2/1/22    [id number]   Glucose  286 (A)      BUN  9      Creatinine  0.41 (A)      eGFR Non  Am  149      eGFR African Am  172      Sodium  136      Potassium  4.5      Chloride  100      Calcium  9.2    "   Total Protein  7.0      Albumin  4.3      Total Bilirubin  0.5      Alkaline Phosphatase  74      AST (SGOT)  12      ALT (SGPT)  6      WBC 6.9       Hemoglobin 12.8       Hematocrit 41.9       Platelets 347       Total Cholesterol    152    Triglycerides    60    HDL Cholesterol    56    LDL Cholesterol     84    Hemoglobin A1C   7.5 (A)     Microalbumin, Urine     3.2   (A) Abnormal value       Comments are available for some flowsheets but are not being displayed.                     Assessment and Plan    Diagnoses and all orders for this visit:    1. Type 1 diabetes mellitus without complication (HCC) (Primary)  -     Tresiba FlexTouch 100 UNIT/ML solution pen-injector injection; Inject 25 Units under the skin into the appropriate area as directed Daily.  Dispense: 5 pen; Refill: 1  -     HumaLOG KwikPen 100 UNIT/ML solution pen-injector; Administer 10 units under the skin three times daily before meals.  Dispense: 5 pen; Refill: 1  -     BD Pen Needle Maribel 2nd Gen 32G X 4 MM misc; 1 each by Other route 4 (Four) Times a Day.  Dispense: 200 each; Refill: 1  -     mirtazapine (REMERON) 7.5 MG tablet; Take 1 tablet by mouth Every Night.  Dispense: 30 tablet; Refill: 5    2. Abnormal weight loss  -     mirtazapine (REMERON) 7.5 MG tablet; Take 1 tablet by mouth Every Night.  Dispense: 30 tablet; Refill: 5      Put in refills for her type 1 diabetes and she will follow up with her endocrinologist.  I put in for Remeron to see if that will help stimulate her appetite a little bit and I counseled that she needs to add 250 to 500 timbo/day to help with weight loss and try to avoid doing that with starches and focus more on proteins and good fats in her diet.        Follow Up   Return in about 8 months (around 11/1/2022) for Annual physical.  Patient was given instructions and counseling regarding her condition or for health maintenance advice. Please see specific information pulled into the AVS if appropriate.

## 2022-11-19 DIAGNOSIS — E10.9 TYPE 1 DIABETES MELLITUS WITHOUT COMPLICATION: ICD-10-CM

## 2022-11-21 RX ORDER — PEN NEEDLE, DIABETIC 32GX 5/32"
NEEDLE, DISPOSABLE MISCELLANEOUS
Qty: 200 EACH | Refills: 0 | Status: SHIPPED | OUTPATIENT
Start: 2022-11-21 | End: 2022-12-20

## 2022-12-19 DIAGNOSIS — E10.9 TYPE 1 DIABETES MELLITUS WITHOUT COMPLICATION: ICD-10-CM

## 2022-12-20 RX ORDER — PEN NEEDLE, DIABETIC 32GX 5/32"
NEEDLE, DISPOSABLE MISCELLANEOUS
Qty: 200 EACH | Refills: 0 | Status: SHIPPED | OUTPATIENT
Start: 2022-12-20

## 2023-04-23 DIAGNOSIS — E10.9 TYPE 1 DIABETES MELLITUS WITHOUT COMPLICATION: ICD-10-CM

## 2023-04-24 RX ORDER — PEN NEEDLE, DIABETIC 32GX 5/32"
NEEDLE, DISPOSABLE MISCELLANEOUS
Qty: 200 EACH | Refills: 0 | Status: SHIPPED | OUTPATIENT
Start: 2023-04-24

## 2023-04-24 NOTE — TELEPHONE ENCOUNTER
Rx Refill Note  Requested Prescriptions     Pending Prescriptions Disp Refills   • BD Pen Needle Lacie 2nd Gen 32G X 4 MM misc [Pharmacy Med Name: B-D LACIE 2ND GEN PEN NDL [id number]] 200 each 0     Sig: USE FOUR TIMES DAILY AS DIRECTED      Last office visit with prescribing clinician: 2/25/2022   Last telemedicine visit with prescribing clinician: Visit date not found   Next office visit with prescribing clinician: Visit date not found                         Would you like a call back once the refill request has been completed: [] Yes [] No    If the office needs to give you a call back, can they leave a voicemail: [] Yes [] No    Eliza Oneill  04/24/23, 09:48 EDT